# Patient Record
Sex: MALE
[De-identification: names, ages, dates, MRNs, and addresses within clinical notes are randomized per-mention and may not be internally consistent; named-entity substitution may affect disease eponyms.]

---

## 2021-02-12 ENCOUNTER — NURSE TRIAGE (OUTPATIENT)
Dept: OTHER | Facility: CLINIC | Age: 51
End: 2021-02-12

## 2021-02-12 NOTE — TELEPHONE ENCOUNTER
Reason for Disposition   COVID-19 vaccine, systemic reactions (e.g., fatigue, fever, muscle aches), questions about    Answer Assessment - Initial Assessment Questions  1. MAIN CONCERN OR SYMPTOM:  \"What is your main concern right now? \" \"What question do you have? \" \"What's the main symptom you're worried about? \" (e.g., fever, pain, redness, swelling)      Fever  2. VACCINE: \"What vaccination did you receive? \" \"Is this your first or second shot? \" (e.g., none; [employer], other)      e[employer]ila[employer]at  3. SYMPTOM ONSET: \"When did the Fever begin? \" (e.g., not relevant; hours, days)       1800 -2/11/2021  4. SYMPTOM SEVERITY: \"How bad is it? \"       Fever and pressure behind eyes and headache  5. FEVER: \"Is there a fever? \" If so, ask: \"What is it, how was it measured, and when did it start? \"       Was 100, normal now. 6. PAST REACTIONS: \"Have you reacted to immunizations before? \" If so, ask: \"What happened? \"      no  7. OTHER SYMPTOMS: \"Do you have any other symptoms? \"      Fever, pressure behind eyes, headache and earache. Protocols used: CORONAVIRUS (COVID-19) VACCINE QUESTIONS AND REACTIONS-ADULT-    Brief description of triage: Caller had [address] vaccination yesterday and complains of headache, earache, sinus pressure and fever of 100.0, caller denies any SOB or difficulty breathing. Triage indicates for patient to Home Care. Care advice provided, patient verbalizes understanding; denies any other questions or concerns; instructed to call back for any new or worsening symptoms. This triage is a result of a call to [address] [place] [address]. Please do not respond to the triage nurse through this encounter. Any subsequent communication should be directly with the patient.